# Patient Record
Sex: FEMALE | Race: WHITE | NOT HISPANIC OR LATINO | Employment: FULL TIME | URBAN - METROPOLITAN AREA
[De-identification: names, ages, dates, MRNs, and addresses within clinical notes are randomized per-mention and may not be internally consistent; named-entity substitution may affect disease eponyms.]

---
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
## 2019-03-01 ENCOUNTER — HOSPITAL ENCOUNTER (EMERGENCY)
Facility: HOSPITAL | Age: 19
Discharge: HOME | End: 2019-03-01
Attending: EMERGENCY MEDICINE

## 2019-03-01 VITALS
SYSTOLIC BLOOD PRESSURE: 151 MMHG | WEIGHT: 135 LBS | BODY MASS INDEX: 26.5 KG/M2 | DIASTOLIC BLOOD PRESSURE: 79 MMHG | TEMPERATURE: 97.9 F | HEIGHT: 60 IN | HEART RATE: 100 BPM | RESPIRATION RATE: 16 BRPM | OXYGEN SATURATION: 100 %

## 2019-03-01 DIAGNOSIS — M54.50 ACUTE LOW BACK PAIN: Primary | ICD-10-CM

## 2019-03-01 LAB
BILIRUB UR QL STRIP: NEGATIVE
CLARITY UR: ABNORMAL
COLOR UR: YELLOW
GLUCOSE UR STRIP-MCNC: NEGATIVE MG/DL
HGB UR QL STRIP.AUTO: NEGATIVE
KETONES UR STRIP-MCNC: ABNORMAL MG/DL
LEUKOCYTE ESTERASE UR QL STRIP: NEGATIVE
NITRITE UR QL STRIP: NEGATIVE
PH UR STRIP.AUTO: 5.5 [PH] (ref 5–8)
PROT UR STRIP-MCNC: NEGATIVE MG/DL
SP GR UR STRIP.AUTO: >=1.03 (ref 1–1.03)
UROBILINOGEN UR QL STRIP.AUTO: 0.2 E.U./DL

## 2019-03-01 PROCEDURE — 99283 EMERGENCY DEPT VISIT LOW MDM: CPT

## 2019-03-01 PROCEDURE — 81003 URINALYSIS AUTO W/O SCOPE: CPT | Performed by: EMERGENCY MEDICINE

## 2019-03-01 RX ORDER — IBUPROFEN 600 MG/1
600 TABLET ORAL EVERY 6 HOURS PRN
Qty: 30 TABLET | Refills: 0 | Status: SHIPPED | OUTPATIENT
Start: 2019-03-01 | End: 2019-03-17

## 2019-03-01 RX ORDER — CYCLOBENZAPRINE HCL 10 MG
10 TABLET ORAL 2 TIMES DAILY PRN
Qty: 20 TABLET | Refills: 0 | Status: SHIPPED | OUTPATIENT
Start: 2019-03-01 | End: 2019-03-17

## 2019-03-02 NOTE — ED PROVIDER NOTES
History  Chief Complaint   Patient presents with    Back Pain     low back pain since wednesday  no urinary c/o  denies other sx  no injury     Patient is a 50-year-old female without significant past medical history who works at [employer] she says for the past several days she has had low back pain  She says the pain does not radiate into her buttocks into her thighs  She has no numbness tingling  She says the pain is only in her lower back  She has no direct trauma  She denies any nausea vomiting patient that no diarrhea constipation  She has no frequency urgency or dysuria  None       Past Medical History:   Diagnosis Date    Viral gastroenteritis     [id number] RESOLVED       Past Surgical History:   Procedure Laterality Date    EYE SURGERY      STRABISMUS SURGERY      TONSILLECTOMY         Family History   Problem Relation Age of Onset    No Known Problems Mother     Hypertension Maternal Grandmother     Hyperlipidemia Maternal Grandfather     Hypertension Maternal Grandfather     No Known Problems Family      I have reviewed and agree with the history as documented  Social History     Tobacco Use    Smoking status: Never Smoker    Smokeless tobacco: Former User   Substance Use Topics    Alcohol use: No    Drug use: No        Review of Systems   Constitutional: Negative for chills, fatigue, fever and unexpected weight change  HENT: Negative for congestion and nosebleeds  Eyes: Negative for visual disturbance  Respiratory: Negative for chest tightness and shortness of breath  Cardiovascular: Negative for chest pain, palpitations and leg swelling  Gastrointestinal: Negative for abdominal pain, blood in stool, diarrhea, nausea and vomiting  Endocrine: Negative for cold intolerance and heat intolerance  Genitourinary: Negative for difficulty urinating  Musculoskeletal: Negative for arthralgias, back pain, gait problem, joint swelling and myalgias     Skin: Negative for rash  Neurological: Negative for dizziness, speech difficulty, weakness and headaches  Psychiatric/Behavioral: Negative for behavioral problems, confusion, self-injury and suicidal ideas  All other systems reviewed and are negative  Physical Exam  Physical Exam   Constitutional: She is oriented to person, place, and time  She appears well-developed and well-nourished  HENT:   Head: Normocephalic and atraumatic  Nose: Nose normal    Eyes: Pupils are equal, round, and reactive to light  EOM are normal    Neck: Normal range of motion  Neck supple  Cardiovascular: Normal rate, regular rhythm and normal heart sounds  Exam reveals no gallop and no friction rub  No murmur heard  Pulmonary/Chest: Effort normal and breath sounds normal  No respiratory distress  She has no wheezes  She has no rales  Abdominal: Soft  She exhibits no distension  There is no tenderness  There is no rebound and no guarding  Musculoskeletal: Normal range of motion  She exhibits no edema  Deep tendon reflexes are intact  She has no for costovertebral angle tenderness  She has negative leg lift bilaterally she does have some mild discomfort over her paraspinal musculature   Neurological: She is alert and oriented to person, place, and time  Skin: Skin is warm and dry  Psychiatric: She has a normal mood and affect  Her behavior is normal  Judgment and thought content normal    Nursing note and vitals reviewed        Vital Signs  ED Triage Vitals [03/01/19 2312]   Temperature Pulse Respirations Blood Pressure SpO2   97 9 °F (36 6 °C) 100 16 151/79 100 %      Temp src Heart Rate Source Patient Position - Orthostatic VS BP Location FiO2 (%)   -- -- -- -- --      Pain Score       5           Vitals:    03/01/19 2312   BP: 151/79   Pulse: 100       Visual Acuity      ED Medications  Medications - No data to display    Diagnostic Studies  Results Reviewed     None                 No orders to display Procedures  Procedures       Phone Contacts  ED Phone Contact    ED Course                               MDM    Disposition  Final diagnoses:   None     ED Disposition     None      Follow-up Information    None         Patient's Medications    No medications on file     No discharge procedures on file      ED Provider  Electronically Signed by           Jensen Castaneda MD  03/02/19 7543

## 2019-03-17 ENCOUNTER — HOSPITAL ENCOUNTER (EMERGENCY)
Facility: HOSPITAL | Age: 19
Discharge: HOME | End: 2019-03-17
Attending: EMERGENCY MEDICINE | Admitting: EMERGENCY MEDICINE

## 2019-03-17 ENCOUNTER — APPOINTMENT (EMERGENCY)
Dept: RADIOLOGY | Facility: HOSPITAL | Age: 19
End: 2019-03-17

## 2019-03-17 VITALS
DIASTOLIC BLOOD PRESSURE: 97 MMHG | BODY MASS INDEX: 26.5 KG/M2 | HEART RATE: 68 BPM | RESPIRATION RATE: 16 BRPM | OXYGEN SATURATION: 98 % | HEIGHT: 60 IN | SYSTOLIC BLOOD PRESSURE: 144 MMHG | TEMPERATURE: 97.6 F | WEIGHT: 135 LBS

## 2019-03-17 DIAGNOSIS — M67.442 GANGLION CYST OF FINGER OF LEFT HAND: ICD-10-CM

## 2019-03-17 DIAGNOSIS — M79.642 LEFT HAND PAIN: Primary | ICD-10-CM

## 2019-03-17 PROCEDURE — 73130 X-RAY EXAM OF HAND: CPT

## 2019-03-17 PROCEDURE — 99283 EMERGENCY DEPT VISIT LOW MDM: CPT

## 2019-03-18 NOTE — ED PROVIDER NOTES
History  Chief Complaint   Patient presents with    Hand Pain     pain base of l eft 3rd finger "with a lump" for 1 week  no injury     A 20YEAR-OLD FEMALE DOMINANT RIGHT HAND PATIENT WITH NO HISTORY OF DISEASE PRESENTS TO THE EMERGENCY DEPARTMENT WITH LEFT MIDDLE FINGER PAIN FOR THE PAST WEEK  SHE WORKS IN A WAREHOUSE UTILIZING BOTH HANDS  SHE DENIES TRAUMA HOWEVER  SHE STATES THAT THE PAIN IS WORSE WITH MOVEMENT  TODAY SHE UNCOVERED A LUMP IN THE HAND  SHE REPORTS NO NUMBNESS OR TINGLING NO INTERFERENCE WITH FUNCTION  None       Past Medical History:   Diagnosis Date    Viral gastroenteritis     [id number] RESOLVED       Past Surgical History:   Procedure Laterality Date    EYE SURGERY      STRABISMUS SURGERY      TONSILLECTOMY         Family History   Problem Relation Age of Onset    No Known Problems Mother     Hypertension Maternal Grandmother     Hyperlipidemia Maternal Grandfather     Hypertension Maternal Grandfather     No Known Problems Family      I have reviewed and agree with the history as documented  Social History     Tobacco Use    Smoking status: Current Every Day Smoker     Packs/day: 0 50    Smokeless tobacco: Former User   Substance Use Topics    Alcohol use: No    Drug use: No        Review of Systems   Constitutional: Negative for chills and fever  HENT: Negative for ear pain, rhinorrhea and sore throat  Eyes: Negative for pain, redness and visual disturbance  Respiratory: Negative for cough and shortness of breath  Cardiovascular: Negative for chest pain and leg swelling  Gastrointestinal: Negative for abdominal pain, diarrhea, nausea and vomiting  Genitourinary: Negative for dysuria, flank pain, frequency and urgency  Musculoskeletal: Negative for back pain, myalgias and neck pain  Skin: Negative for rash  Neurological: Negative for dizziness, weakness, light-headedness and headaches  Hematological: Negative      Psychiatric/Behavioral: Negative for agitation, confusion and suicidal ideas  The patient is not nervous/anxious  All other systems reviewed and are negative  Physical Exam  Physical Exam   Constitutional: She is oriented to person, place, and time  She appears well-developed and well-nourished  HENT:   Nose: Nose normal    Mouth/Throat: Oropharynx is clear and moist  No oropharyngeal exudate  Eyes: Pupils are equal, round, and reactive to light  Conjunctivae and EOM are normal  No scleral icterus  Neck: Normal range of motion  Neck supple  No JVD present  No tracheal deviation present  Cardiovascular: Normal rate, regular rhythm and normal heart sounds  No murmur heard  Pulmonary/Chest: Effort normal and breath sounds normal  No respiratory distress  She has no wheezes  She has no rales  Abdominal: Soft  Bowel sounds are normal  There is no tenderness  There is no guarding  Musculoskeletal: Normal range of motion  She exhibits tenderness  She exhibits no edema  THERE ARE NO DEFORMITIES TO THE RIGHT HAND  THE LEFT HAND IS UNREMARKABLE WITH SOME MINOR TENDERNESS OF THE PALMAR ASPECT JUST PROXIMAL TO THE MCP JOINT OF THE 3RD DIGIT WHERE THERE IS ASSOCIATED APPROXIMATE 3-4 MM SHE IS SOMEWHAT FIRM MOVABLE SUBDERMAL MASS  NEUROVASCULAR AND SENSORY IS INTACT WITH FULL RANGE OF MOTION THE DIGITS AND HAND  THERE ARE NO ASSOCIATED DEFORMITIES OF THE DIGITS THE LEFT HAND  Neurological: She is alert and oriented to person, place, and time  No cranial nerve deficit or sensory deficit  She exhibits normal muscle tone  5/5 motor, nl sens   Skin: Skin is warm and dry  Psychiatric: She has a normal mood and affect  Her behavior is normal    Nursing note and vitals reviewed        Vital Signs  ED Triage Vitals [03/17/19 2053]   Temperature Pulse Respirations Blood Pressure SpO2   97 6 °F (36 4 °C) 68 16 144/97 98 %      Temp src Heart Rate Source Patient Position - Orthostatic VS BP Location FiO2 (%)   -- -- -- -- -- Pain Score       6           Vitals:    03/17/19 2053   BP: 144/97   Pulse: 68       qSOFA     Row Name 03/17/19 2053                Altered mental status GCS < 15  --        Respiratory Rate > / =89  0        Systolic BP < / =546  0        Q Sofa Score  0              Visual Acuity      ED Medications  Medications - No data to display    Diagnostic Studies  Results Reviewed     None                 XR hand 3+ views LEFT    (Results Pending)              Procedures  Procedures       Phone Contacts  ED Phone Contact    ED Course                               MDM    Disposition  Final diagnoses:   None     ED Disposition     None      Follow-up Information    None         Patient's Medications   Discharge Prescriptions    No medications on file     No discharge procedures on file      ED Provider  Electronically Signed by           Jovany Root MD  03/17/19 2476

## 2019-06-23 ENCOUNTER — HOSPITAL ENCOUNTER (EMERGENCY)
Facility: HOSPITAL | Age: 19
Discharge: HOME | End: 2019-06-23
Attending: EMERGENCY MEDICINE

## 2019-06-23 VITALS
OXYGEN SATURATION: 100 % | HEIGHT: 60 IN | DIASTOLIC BLOOD PRESSURE: 81 MMHG | SYSTOLIC BLOOD PRESSURE: 126 MMHG | TEMPERATURE: 98.2 F | HEART RATE: 81 BPM | BODY MASS INDEX: 28.47 KG/M2 | WEIGHT: 145 LBS | RESPIRATION RATE: 20 BRPM

## 2019-06-23 DIAGNOSIS — L73.9 FOLLICULITIS: Primary | ICD-10-CM

## 2019-06-23 PROCEDURE — 99282 EMERGENCY DEPT VISIT SF MDM: CPT

## 2019-06-23 RX ORDER — SULFAMETHOXAZOLE AND TRIMETHOPRIM 800; 160 MG/1; MG/1
1 TABLET ORAL 2 TIMES DAILY
Qty: 28 TABLET | Refills: 0 | Status: SHIPPED | OUTPATIENT
Start: 2019-06-23 | End: 2019-07-07

## 2019-06-23 RX ORDER — SULFAMETHOXAZOLE AND TRIMETHOPRIM 800; 160 MG/1; MG/1
1 TABLET ORAL ONCE
Status: COMPLETED | OUTPATIENT
Start: 2019-06-23 | End: 2019-06-23

## 2019-06-23 RX ADMIN — SULFAMETHOXAZOLE AND TRIMETHOPRIM 1 TABLET: 800; 160 TABLET ORAL at 19:28

## 2019-06-23 NOTE — ED PROVIDER NOTES
History  Chief Complaint   Patient presents with    Rash     Patient has raised areas on inside of thighs and on buttucks thast are painful and appear to be spreading  No fevers per pt     5 months of skin spots on inside thighs and buttocks  She pops small pimples  No fever  History provided by:  Patient  Rash   Associated symptoms: no abdominal pain, no fever, no shortness of breath and no sore throat        None       Past Medical History:   Diagnosis Date    Viral gastroenteritis     [id number] RESOLVED       Past Surgical History:   Procedure Laterality Date    EYE SURGERY      STRABISMUS SURGERY      TONSILLECTOMY         Family History   Problem Relation Age of Onset    No Known Problems Mother     Hypertension Maternal Grandmother     Hyperlipidemia Maternal Grandfather     Hypertension Maternal Grandfather     No Known Problems Family      I have reviewed and agree with the history as documented  Social History     Tobacco Use    Smoking status: Current Every Day Smoker     Packs/day: 0 50    Smokeless tobacco: Former User   Substance Use Topics    Alcohol use: No    Drug use: No        Review of Systems   Constitutional: Negative for chills and fever  HENT: Negative for rhinorrhea, sinus pain and sore throat  Respiratory: Negative for cough and shortness of breath  Gastrointestinal: Negative for abdominal pain  Genitourinary: Negative for dysuria  Musculoskeletal: Negative for neck pain  Skin: Positive for rash  Physical Exam  Physical Exam   Constitutional: She appears well-developed and well-nourished  HENT:   Head: Normocephalic  Eyes: Conjunctivae are normal    Neck: Neck supple  Pulmonary/Chest: Effort normal    Musculoskeletal: Normal range of motion  She exhibits no edema  Neurological: She is alert  Skin: Rash noted  Nursing note and vitals reviewed        Vital Signs  ED Triage Vitals [06/23/19 1917]   Temperature Pulse Respirations Blood Pressure SpO2   98 2 °F (36 8 °C) 81 20 126/81 100 %      Temp Source Heart Rate Source Patient Position - Orthostatic VS BP Location FiO2 (%)   Tympanic Monitor Sitting Left arm --      Pain Score       2           Vitals:    06/23/19 1917   BP: 126/81   Pulse: 81   Patient Position - Orthostatic VS: Sitting         Visual Acuity      ED Medications  Medications   sulfamethoxazole-trimethoprim (BACTRIM DS) 800-160 mg per tablet 1 tablet (1 tablet Oral Given 6/23/19 1928)       Diagnostic Studies  Results Reviewed     None                 No orders to display              Procedures  Procedures       ED Course                               MDM    Disposition  Final diagnoses: Folliculitis     Time reflects when diagnosis was documented in both MDM as applicable and the Disposition within this note     Time User Action Codes Description Comment    6/23/2019  7:25 PM [address] [address] [D56 0] Folliculitis       ED Disposition     ED Disposition Condition Date/Time Comment    Discharge Good Sun Jun 23, 2019  7:25 PM Jewels Sun discharge to home/self care  Follow-up Information    None         Patient's Medications   Discharge Prescriptions    MUPIROCIN (BACTROBAN) 2 % NASAL OINTMENT    Apply twice a day to sores on skin until all healed fully       Start Date: 6/23/2019 End Date: --       Order Dose: --       Quantity: 5 g    Refills: 0    SULFAMETHOXAZOLE-TRIMETHOPRIM (BACTRIM DS) 800-160 MG PER TABLET    Take 1 tablet by mouth 2 (two) times a day for 14 days smx-tmp DS (BACTRIM) 800-160 mg tabs (1tab q12 D10)       Start Date: 6/23/2019 End Date: 7/7/2019       Order Dose: 1 tablet       Quantity: 28 tablet    Refills: 0     No discharge procedures on file      ED Provider  Electronically Signed by           Tere Boyd MD  06/23/19 6962

## 2019-06-23 NOTE — DISCHARGE INSTRUCTIONS
Use antibiotic pill twice a day for 2 week  Use ointment on sores twice a day until all healed  See your regular Family Doctor if not better after 2 weeks

## 2019-08-22 ENCOUNTER — HOSPITAL ENCOUNTER (EMERGENCY)
Facility: HOSPITAL | Age: 19
Discharge: HOME | End: 2019-08-22
Admitting: EMERGENCY MEDICINE

## 2019-08-22 VITALS
OXYGEN SATURATION: 97 % | HEART RATE: 87 BPM | RESPIRATION RATE: 18 BRPM | DIASTOLIC BLOOD PRESSURE: 75 MMHG | TEMPERATURE: 98.8 F | WEIGHT: 135 LBS | BODY MASS INDEX: 26.5 KG/M2 | HEIGHT: 60 IN | SYSTOLIC BLOOD PRESSURE: 137 MMHG

## 2019-08-22 DIAGNOSIS — M65.332 TRIGGER MIDDLE FINGER OF LEFT HAND: Primary | ICD-10-CM

## 2019-08-22 PROCEDURE — 99283 EMERGENCY DEPT VISIT LOW MDM: CPT

## 2019-08-22 RX ORDER — NAPROXEN 500 MG/1
500 TABLET ORAL ONCE
Status: COMPLETED | OUTPATIENT
Start: 2019-08-22 | End: 2019-08-22

## 2019-08-22 RX ADMIN — NAPROXEN 500 MG: 500 TABLET ORAL at 04:32

## 2019-08-22 NOTE — DISCHARGE INSTRUCTIONS
WEAR THE SPLINT INTERMITTENTLY, AS NEEDED  TAKE 2 ALEVE TWICE DAILY WITH FOOD FOR 3 DAYS THEN AS NEEDED WITH FLARE-UP SEVERE SYMPTOMS    CONTACT YOUR FAMILY DOCTOR OR THE ORTHOPEDIC DOCTOR LISTED FOR POSSIBLE EVALUATION FOR INJECTIONS IF YOU HAVE FREQUENT RECURRENCE TRIGGER FINGER SYMPTOMS

## 2019-08-22 NOTE — ED PROVIDER NOTES
History  Chief Complaint   Patient presents with    Finger Pain     reports injured her left middle finger in the past playing baseketball - works at [employer] and yesterday pushed a box and complaining of pain in left middle finger      PATIENT PRESENTS TO THE ER FOR EVALUATION OF LEFT HAND PAIN  PATIENT HAS RECURRENT PAIN AT THE BASE OF HER LEFT MIDDLE FINGER, ON THE PALMAR SURFACE  SHE WAS PREVIOUSLY EVALUATED AT [facility] SEVERAL MONTHS AGO AND NOTES SHE WAS GIVEN A SPLINT  SHE LEFT WORK TODAY BECAUSE OF RECURRENT PAIN AT THE BASE OF HER MIDDLE FINGER  SHE NOTES THAT SHE HAS HAD THE FINGER STUCK IN A FLEXION POSITION A FEW TIMES  THIS OCCURS LESS THAN 1 TIME PER WEEK  Prior to Admission Medications   Prescriptions Last Dose Informant Patient Reported? Taking?   mupirocin (BACTROBAN) 2 % nasal ointment   No No   Sig: Apply twice a day to sores on skin until all healed fully      Facility-Administered Medications: None       Past Medical History:   Diagnosis Date    Viral gastroenteritis     [id number] RESOLVED       Past Surgical History:   Procedure Laterality Date    EYE SURGERY      STRABISMUS SURGERY      TONSILLECTOMY         Family History   Problem Relation Age of Onset    No Known Problems Mother     Hypertension Maternal Grandmother     Hyperlipidemia Maternal Grandfather     Hypertension Maternal Grandfather     No Known Problems Family      I have reviewed and agree with the history as documented  Social History     Tobacco Use    Smoking status: Current Every Day Smoker     Packs/day: 0 50    Smokeless tobacco: Former User   Substance Use Topics    Alcohol use: No    Drug use: No        Review of Systems   Constitutional: Negative for chills and fever  Musculoskeletal: Positive for arthralgias  Negative for neck pain  Skin: Negative  Neurological: Negative  Physical Exam  Physical Exam   Constitutional: She is oriented to person, place, and time   She appears well-developed and well-nourished  PATIENT IS AMBULATORY  NO ACUTE DISTRESS  NONTOXIC  HENT:   Head: Normocephalic and atraumatic  Musculoskeletal: She exhibits no deformity  PATIENT HAS NO DEFORMITY OR VISIBLE OR PALPABLE SWELLING OF HER LEFT HAND PARTICULARLY THE INDEX FINGER  THERE IS NORMAL TENDON FUNCTION AT THIS TIME BOTH ACTIVE AND PASSIVE  THERE IS NO CLICKING OR CATCHING OF THE INDIVIDUAL JOINTS OF THE RIGHT MIDDLE FINGER  THE POSSIBLE NODULARITY AT THE PALMAR ASPECT OF THE MCP JOINT IS PRESENT  Neurological: She is alert and oriented to person, place, and time  No cranial nerve deficit  She exhibits normal muscle tone  Skin: Skin is warm  Capillary refill takes less than 2 seconds  No rash noted  Vitals reviewed  Vital Signs  ED Triage Vitals [08/22/19 0414]   Temperature Pulse Respirations Blood Pressure SpO2   98 8 °F (37 1 °C) 87 18 137/75 97 %      Temp Source Heart Rate Source Patient Position - Orthostatic VS BP Location FiO2 (%)   Tympanic Monitor Sitting Left arm --      Pain Score       8           Vitals:    08/22/19 0414   BP: 137/75   Pulse: 87   Patient Position - Orthostatic VS: Sitting         Visual Acuity      ED Medications  Medications   naproxen (NAPROSYN) tablet 500 mg (500 mg Oral Given 8/22/19 0432)       Diagnostic Studies  Results Reviewed     None                 No orders to display              Procedures  Procedures       ED Course        EXAMINATION EVALUATION  BASED ON ROOM THE PATIENT'S DESCRIPTION, THE PATIENT LIKELY HAS A TENOSYNOVITIS WITH A TRIGGER FINGER OCCURRENCE BUT NOT PRESENTLY  REVIEWED BASIC TREATMENT OF ANTI-INFLAMMATORY AND INTERMITTENT SPLINTING    RECOMMENDED CONTACTING ORTHOPEDICS FOR DISCUSSION OF POSSIBLE STEROID INJECTIONS IF THE TRIGGER FOR HER BECOMES RECURRENCE                         MDM    Disposition  Final diagnoses:   Trigger middle finger of left hand     Time reflects when diagnosis was documented in both MDM as applicable and the Disposition within this note     Time User Action Codes Description Comment    8/22/2019  4:30 AM Marcarolina Self Add [Q11 308] Trigger middle finger of left hand       ED Disposition     ED Disposition Condition Date/Time Comment    Discharge Stable Thu Aug 22, 2019  4:31 AM Jewels Sun discharge to home/self care  Follow-up Information     Follow up With Specialties Details Why Contact Info    Tal Cavanaugh DO Orthopedic Surgery   [address]  [facility]  [address] [address]  [address] [facility] [address]            Discharge Medication List as of 8/22/2019  4:32 AM      CONTINUE these medications which have NOT CHANGED    Details   mupirocin (BACTROBAN) 2 % nasal ointment Apply twice a day to sores on skin until all healed fully, Print           No discharge procedures on file      ED Provider  Electronically Signed by           Rossi Saavedra DO  08/22/19 5414

## 2019-08-30 ENCOUNTER — OFFICE VISIT (OUTPATIENT)
Dept: OBGYN CLINIC | Facility: CLINIC | Age: 19
End: 2019-08-30

## 2019-08-30 VITALS
HEIGHT: 60 IN | BODY MASS INDEX: 26.5 KG/M2 | WEIGHT: 135 LBS | HEART RATE: 82 BPM | SYSTOLIC BLOOD PRESSURE: 111 MMHG | DIASTOLIC BLOOD PRESSURE: 74 MMHG

## 2019-08-30 DIAGNOSIS — M79.645 PAIN OF FINGER OF LEFT HAND: Primary | ICD-10-CM

## 2019-08-30 PROCEDURE — 99203 OFFICE O/P NEW LOW 30 MIN: CPT | Performed by: ORTHOPAEDIC SURGERY

## 2019-08-30 NOTE — LETTER
August 30, 2019     Patient: Jewels Sun   YOB: 2000   Date of Visit: 8/30/2019       To Whom it May Concern:    Rosa Torre is under my professional care  She was seen in my office on 8/30/2019  She is cleared to return to work as tolerated without limitations or restrictions beginning 9/3/2019  If you have any questions or concerns, please don't hesitate to call           Sincerely,          Michelle Trinh MD        CC: No Recipients

## 2019-08-30 NOTE — PROGRESS NOTES
CHIEF COMPLAINT:  Chief Complaint   Patient presents with    Left Middle Finger - Pain       SUBJECTIVE:  Jewels Sun is a 20y o  year old RHD female who presents with chief complaint of left long finger pain  She reports that she suffered a hyperextension injury in March of this year while playing basketball  Her pain following is injury had somewhat improved but she mentions recurrence on 8/22/2019 while at work  She was initially evaluated at [facility] ED where xrays were taken and read as negative  She describes her pain as being palmar, and located at the base of the left long finger  She reports occasional "clicking and sticking" with finger flexion that gets worse with work related activities  She denies any apparent bruising, swelling, numbness, tingling, or feelings of instability  She is not currently taking any medications for pain  PAST MEDICAL HISTORY:  Past Medical History:   Diagnosis Date    Viral gastroenteritis     [id number] RESOLVED       PAST SURGICAL HISTORY:  Past Surgical History:   Procedure Laterality Date    EYE SURGERY      STRABISMUS SURGERY      TONSILLECTOMY         FAMILY HISTORY:  Family History   Problem Relation Age of Onset    No Known Problems Mother     Hypertension Maternal Grandmother     Hyperlipidemia Maternal Grandfather     Hypertension Maternal Grandfather     No Known Problems Family        SOCIAL HISTORY:  Social History     Tobacco Use    Smoking status: Current Every Day Smoker     Packs/day: 0 50    Smokeless tobacco: Former User   Substance Use Topics    Alcohol use: No    Drug use: No       MEDICATIONS:  No current outpatient medications on file  ALLERGIES:  Allergies   Allergen Reactions    Penicillins        REVIEW OF SYSTEMS:  Review of Systems   Constitutional: Negative for chills, fever and unexpected weight change  HENT: Negative for hearing loss, nosebleeds and sore throat      Eyes: Negative for pain, redness and visual disturbance  Respiratory: Negative for cough, shortness of breath and wheezing  Cardiovascular: Negative for chest pain, palpitations and leg swelling  Gastrointestinal: Negative for abdominal pain, nausea and vomiting  Endocrine: Negative for polydipsia and polyuria  Genitourinary: Negative for dysuria and hematuria  Musculoskeletal:        As noted in HPI   Skin: Negative for rash and wound  Neurological: Negative for dizziness, numbness and headaches  Psychiatric/Behavioral: Negative for decreased concentration and suicidal ideas  The patient is not nervous/anxious  VITALS:  Vitals:    08/30/19 1127   BP: 111/74   Pulse: 82       LABS:  HgA1c: No results found for: HGBA1C  BMP:   Lab Results   Component Value Date    GLUCOSE 88 03/10/2015    CALCIUM 9 5 04/27/2018     04/27/2018    K 3 9 04/27/2018    CO2 25 04/27/2018     04/27/2018    BUN 10 04/27/2018    CREATININE 0 73 04/27/2018       _____________________________________________________  PHYSICAL EXAMINATION:  General: well developed and well nourished, alert, oriented times 3 and appears comfortable  Psychiatric: Normal  HEENT: Trachea Midline, No torticollis  Pulmonary: No audible wheezing or respiratory distress   Skin: No masses, erythema, lacerations, fluctation, ulcerations  Neurovascular: Sensation Intact to the Median, Ulnar, Radial Nerve, Motor Intact to the Median, Ulnar, Radial Nerve and Pulses Intact    MUSCULOSKELETAL EXAMINATION:  Left hand - patient presents with no obvious anatomical deformities  Skin is warm and dry to touch with no signs of erythema, ecchymosis, or infection  She is tender to palpation over the long finger flexor tendon at the A1 pulley with no palpable deformity or reproducible clicking on exam   She demonstrates full composite finger flexion and extension of the long finger as compared to contralateral extremity and other fingers    MP joint is stable to varus, valgus, anterior, and posterior stress  2+ distal radial pulse with brisk capillary refill distally  Sensation intact     ___________________________________________________  STUDIES REVIEWED:  Attending Physician has personally reviewed pertinent imaging and/or reports in PACS, impression is as follows:    Radiographic series taken 8/22/2019 of the left hand shows no signs of osseous abnormalities or malalignment  PROCEDURES PERFORMED:  Procedures  No Procedures performed today    _____________________________________________________  ASSESSMENT/PLAN:  Left long finger flexor tendon strain  · Discussed with patient that there is no reproducible triggering on exam and no palpable deformity of the flexor tendon that would denote and active trigger finger pathology  · Moist heat and MP joint extension stretching for increased flexibility of the tendon  · Encouraged to use hand normally  · Discussed that if symptoms should worsen, or triggering becomes more consistent she can contact the office for follow-up appointment and we can consider injection as treatment    Diagnoses and all orders for this visit:    Pain of finger of left hand    Other orders  -     Cancel: XR finger left third digit-middle; Future        Follow Up:  Return if symptoms worsen or fail to improve, for Re-evaluation  Work/school status:  Employed for [employer], return to work without limitations or restrictions beginning 9/3/2019    To Do Next Visit:  Re-evaluation of current issue    General Discussions:  Trigger Finger: The anatomy and physiology of trigger finger was discussed with the patient today in the office  Edema and increased contact pressure within the flexor tendons at the A1 pulley can cause pain, crepitation, and triggering or locking of the digit resulting in limitation of function  Symptoms can occur at anytime but are typically worse in the morning or after a brief rest from repetitive activity    Treatment options include resting/nighttime MP blocking splints to decrease edema, oral anti-inflammatory medications, home or formal therapy exercises, up to 2 steroid injections within the tendon sheath, or surgical release  While majority of patients do respond to conservative treatment, up to 20% may require surgical release         Scribe Attestation    I,:   Odell Maldonado am acting as a scribe while in the presence of the attending physician :        I,:   Eddie Donnelly MD personally performed the services described in this documentation    as scribed in my presence :

## 2020-01-03 ENCOUNTER — OFFICE VISIT (OUTPATIENT)
Dept: FAMILY MEDICINE CLINIC | Facility: CLINIC | Age: 20
End: 2020-01-03
Payer: COMMERCIAL

## 2020-01-03 VITALS
SYSTOLIC BLOOD PRESSURE: 120 MMHG | RESPIRATION RATE: 16 BRPM | WEIGHT: 154 LBS | HEART RATE: 104 BPM | TEMPERATURE: 97.7 F | OXYGEN SATURATION: 98 % | HEIGHT: 59 IN | DIASTOLIC BLOOD PRESSURE: 84 MMHG | BODY MASS INDEX: 31.04 KG/M2

## 2020-01-03 DIAGNOSIS — A08.4 VIRAL GASTROENTERITIS: Primary | ICD-10-CM

## 2020-01-03 PROCEDURE — 99213 OFFICE O/P EST LOW 20 MIN: CPT | Performed by: PHYSICIAN ASSISTANT

## 2020-01-03 PROCEDURE — 3008F BODY MASS INDEX DOCD: CPT | Performed by: PHYSICIAN ASSISTANT

## 2020-01-03 RX ORDER — ONDANSETRON 4 MG/1
4 TABLET, FILM COATED ORAL EVERY 8 HOURS PRN
Qty: 20 TABLET | Refills: 0 | Status: SHIPPED | OUTPATIENT
Start: 2020-01-03

## 2020-01-03 NOTE — LETTER
January 3, 2020     Patient: Jewels Sun   YOB: 2000   Date of Visit: 1/3/2020       To Whom it May Concern:    Sheldon Blevins is under my professional care  She was seen in my office on 1/3/2020  She may be excused from work Jan 1-3rd  She may return to work on Sunday Jan 5th  If you have any questions or concerns, please don't hesitate to call           Sincerely,          Masha Rivera PA-C

## 2020-01-16 ENCOUNTER — OFFICE VISIT (OUTPATIENT)
Dept: URGENT CARE | Facility: HOSPITAL | Age: 20
End: 2020-01-16

## 2020-01-16 VITALS
SYSTOLIC BLOOD PRESSURE: 115 MMHG | HEIGHT: 60 IN | HEART RATE: 97 BPM | TEMPERATURE: 97.5 F | DIASTOLIC BLOOD PRESSURE: 74 MMHG | OXYGEN SATURATION: 97 % | RESPIRATION RATE: 18 BRPM | WEIGHT: 152.2 LBS | BODY MASS INDEX: 29.88 KG/M2

## 2020-01-16 DIAGNOSIS — J02.9 SORE THROAT: Primary | ICD-10-CM

## 2020-01-16 LAB — S PYO AG THROAT QL: NEGATIVE

## 2020-01-16 PROCEDURE — 99203 OFFICE O/P NEW LOW 30 MIN: CPT | Performed by: EMERGENCY MEDICINE

## 2020-01-16 PROCEDURE — 87880 STREP A ASSAY W/OPTIC: CPT | Performed by: EMERGENCY MEDICINE

## 2020-01-16 PROCEDURE — 87070 CULTURE OTHR SPECIMN AEROBIC: CPT | Performed by: EMERGENCY MEDICINE

## 2020-01-16 RX ORDER — AZITHROMYCIN 250 MG/1
TABLET, FILM COATED ORAL
Qty: 6 TABLET | Refills: 0 | Status: SHIPPED | OUTPATIENT
Start: 2020-01-16 | End: 2020-01-20

## 2020-01-16 NOTE — PATIENT INSTRUCTIONS
1   Your rapid strep test is negativ today  2  A throat culture is pending in 48 hours  Please call for results  3  Chloroseptic spray for pain  4   May use Tylenol or Advil for pain  Pharyngitis   WHAT YOU NEED TO KNOW:   Pharyngitis, or sore throat, is inflammation of the tissues and structures in your pharynx (throat)  Pharyngitis is most often caused by bacteria  It may also be caused by a cold or flu virus  Other causes include smoking, allergies, or acid reflux  DISCHARGE INSTRUCTIONS:   Call 911 for any of the following:   · You have trouble breathing or swallowing because your throat is swollen or sore  Return to the emergency department if:   · You are drooling because it hurts too much to swallow  · Your fever is higher than 102? F (39?C) or lasts longer than 3 days  · You are confused  · You taste blood in your throat  Contact your healthcare provider if:   · Your throat pain gets worse  · You have a painful lump in your throat that does not go away after 5 days  · Your symptoms do not improve after 5 days  · You have questions or concerns about your condition or care  Medicines:  Viral pharyngitis will go away on its own without treatment  Your sore throat should start to feel better in 3 to 5 days for both viral and bacterial infections  You may need any of the following:  · Antibiotics  treat a bacterial infection  · NSAIDs , such as ibuprofen, help decrease swelling, pain, and fever  NSAIDs can cause stomach bleeding or kidney problems in certain people  If you take blood thinner medicine, always ask your healthcare provider if NSAIDs are safe for you  Always read the medicine label and follow directions  · Acetaminophen  decreases pain and fever  It is available without a doctor's order  Ask how much to take and how often to take it  Follow directions  Acetaminophen can cause liver damage if not taken correctly  · Take your medicine as directed    Contact your healthcare provider if you think your medicine is not helping or if you have side effects  Tell him or her if you are allergic to any medicine  Keep a list of the medicines, vitamins, and herbs you take  Include the amounts, and when and why you take them  Bring the list or the pill bottles to follow-up visits  Carry your medicine list with you in case of an emergency  Manage your symptoms:   · Gargle salt water  Mix ¼ teaspoon salt in an 8 ounce glass of warm water and gargle  This may help decrease swelling in your throat  · Drink liquids as directed  You may need to drink more liquids than usual  Liquids may help soothe your throat and prevent dehydration  Ask how much liquid to drink each day and which liquids are best for you  · Use a cool-steam humidifier  to help moisten the air in your room and calm your cough  · Soothe your throat  with cough drops, ice, soft foods, or popsicles  Prevent the spread of pharyngitis:  Cover your mouth and nose when you cough or sneeze  Do not share food or drinks  Wash your hands often  Use soap and water  If soap and water are unavailable, use an alcohol based hand   Follow up with your healthcare provider as directed:  Write down your questions so you remember to ask them during your visits  © 2017 [facility] Information is for End User's use only and may not be sold, redistributed or otherwise used for commercial purposes  All illustrations and images included in CareNotes® are the copyrighted property of [employer]  or [employer]  The above information is an  only  It is not intended as medical advice for individual conditions or treatments  Talk to your doctor, nurse or pharmacist before following any medical regimen to see if it is safe and effective for you

## 2020-01-16 NOTE — PROGRESS NOTES
[facility]        NAME: Gamaliel Douglas is a 23 y o  female  : 2000    MRN: [id number]  DATE: 2020  TIME: 5:13 PM    Assessment and Plan   Sore throat [J02 9]  1  Sore throat  POCT rapid strepA    azithromycin (ZITHROMAX) 250 mg tablet     Rapid strep:  Negative  Throat culture pending  Treated on clinical presentation  Patient Instructions     Patient Instructions   1  Your rapid strep test is negativ today  2  A throat culture is pending in 48 hours  Please call for results  3  Chloroseptic spray for pain  4   May use Tylenol or Advil for pain  Pharyngitis   WHAT YOU NEED TO KNOW:   Pharyngitis, or sore throat, is inflammation of the tissues and structures in your pharynx (throat)  Pharyngitis is most often caused by bacteria  It may also be caused by a cold or flu virus  Other causes include smoking, allergies, or acid reflux  DISCHARGE INSTRUCTIONS:   Call 911 for any of the following:   · You have trouble breathing or swallowing because your throat is swollen or sore  Return to the emergency department if:   · You are drooling because it hurts too much to swallow  · Your fever is higher than 102? F (39?C) or lasts longer than 3 days  · You are confused  · You taste blood in your throat  Contact your healthcare provider if:   · Your throat pain gets worse  · You have a painful lump in your throat that does not go away after 5 days  · Your symptoms do not improve after 5 days  · You have questions or concerns about your condition or care  Medicines:  Viral pharyngitis will go away on its own without treatment  Your sore throat should start to feel better in 3 to 5 days for both viral and bacterial infections  You may need any of the following:  · Antibiotics  treat a bacterial infection  · NSAIDs , such as ibuprofen, help decrease swelling, pain, and fever  NSAIDs can cause stomach bleeding or kidney problems in certain people   If you take blood thinner medicine, always ask your healthcare provider if NSAIDs are safe for you  Always read the medicine label and follow directions  · Acetaminophen  decreases pain and fever  It is available without a doctor's order  Ask how much to take and how often to take it  Follow directions  Acetaminophen can cause liver damage if not taken correctly  · Take your medicine as directed  Contact your healthcare provider if you think your medicine is not helping or if you have side effects  Tell him or her if you are allergic to any medicine  Keep a list of the medicines, vitamins, and herbs you take  Include the amounts, and when and why you take them  Bring the list or the pill bottles to follow-up visits  Carry your medicine list with you in case of an emergency  Manage your symptoms:   · Gargle salt water  Mix ¼ teaspoon salt in an 8 ounce glass of warm water and gargle  This may help decrease swelling in your throat  · Drink liquids as directed  You may need to drink more liquids than usual  Liquids may help soothe your throat and prevent dehydration  Ask how much liquid to drink each day and which liquids are best for you  · Use a cool-steam humidifier  to help moisten the air in your room and calm your cough  · Soothe your throat  with cough drops, ice, soft foods, or popsicles  Prevent the spread of pharyngitis:  Cover your mouth and nose when you cough or sneeze  Do not share food or drinks  Wash your hands often  Use soap and water  If soap and water are unavailable, use an alcohol based hand   Follow up with your healthcare provider as directed:  Write down your questions so you remember to ask them during your visits  © 2017 [facility] Information is for End User's use only and may not be sold, redistributed or otherwise used for commercial purposes   All illustrations and images included in CareNotes® are the copyrighted property of [employer]  or [employer]  The above information is an  only  It is not intended as medical advice for individual conditions or treatments  Talk to your doctor, nurse or pharmacist before following any medical regimen to see if it is safe and effective for you  Follow up with PCP in 3-5 days  Proceed to  ER if symptoms worsen  Chief Complaint     Chief Complaint   Patient presents with    Sore Throat     pt states that she has a "clicking" feeling in the left side of her throat when she swallows, and it has become painful  History of Present Illness       This is a 19-year-old who presents with onset of 24 hours of sore throat, left side worse than right  and a clicking sensation the left side of her throat when she swallows  She has not had runny nose, cough, or fever with this  She does smoke 5-6 cigarettes per day  She has had no flu shot  There are no others ill at home  Review of Systems   Review of Systems   Constitutional: Negative for fever  HENT: Positive for sore throat  Negative for congestion, rhinorrhea, sinus pressure and sinus pain  Eyes: Negative for discharge and redness  Respiratory: Negative for cough, shortness of breath and wheezing  Cardiovascular: Negative for chest pain  Gastrointestinal: Negative for diarrhea, nausea and vomiting  Musculoskeletal: Negative for arthralgias and myalgias           Current Medications       Current Outpatient Medications:     azithromycin (ZITHROMAX) 250 mg tablet, Take 2 tablets today then 1 tablet daily x 4 days, Disp: 6 tablet, Rfl: 0    ondansetron (ZOFRAN) 4 mg tablet, Take 1 tablet (4 mg total) by mouth every 8 (eight) hours as needed for nausea or vomiting (Patient not taking: Reported on 1/16/2020), Disp: 20 tablet, Rfl: 0    Current Allergies     Allergies as of 01/16/2020 - Reviewed 01/16/2020   Allergen Reaction Noted    Penicillins  03/01/2019            The following portions of the patient's history were reviewed and updated as appropriate: allergies, current medications, past family history, past medical history, past social history, past surgical history and problem list      Past Medical History:   Diagnosis Date    Viral gastroenteritis     [id number] RESOLVED       Past Surgical History:   Procedure Laterality Date    EYE SURGERY      STRABISMUS SURGERY      TONSILLECTOMY         Family History   Problem Relation Age of Onset    No Known Problems Mother     Hypertension Maternal Grandmother     Hyperlipidemia Maternal Grandfather     Hypertension Maternal Grandfather     No Known Problems Family     No Known Problems Father          Medications have been verified  Objective   /74   Pulse 97   Temp 97 5 °F (36 4 °C) (Tympanic)   Resp 18   Ht 5' (1 524 m)   Wt 69 kg (152 lb 3 2 oz)   LMP 01/11/2020 (Exact Date)   SpO2 97%   BMI 29 72 kg/m²        Physical Exam     Physical Exam   Constitutional: She is oriented to person, place, and time  She appears well-developed and well-nourished  HENT:   Head: Normocephalic and atraumatic  Right Ear: Hearing, tympanic membrane, external ear and ear canal normal    Left Ear: Hearing, tympanic membrane, external ear and ear canal normal    Nose: Nose normal    Mouth/Throat: Mucous membranes are normal  No oral lesions  No uvula swelling  Posterior oropharyngeal erythema present  No oropharyngeal exudate or tonsillar abscesses  There is symmetric rise to the soft palate  Eyes: Pupils are equal, round, and reactive to light  Conjunctivae and EOM are normal    Neck: Normal range of motion  Neck supple  Cardiovascular: Normal rate, regular rhythm and normal heart sounds  No murmur heard  Pulmonary/Chest: Effort normal and breath sounds normal  She has no wheezes  She has no rhonchi  She has no rales  Abdominal: Soft  Bowel sounds are normal  She exhibits no distension and no mass  There is no tenderness     Musculoskeletal: Normal range of motion  Lymphadenopathy:     She has no cervical adenopathy  Neurological: She is alert and oriented to person, place, and time  Skin: Skin is warm and dry  No rash noted  No erythema  Psychiatric: She has a normal mood and affect  Her behavior is normal    Nursing note and vitals reviewed

## 2020-01-18 LAB — BACTERIA THROAT CULT: NORMAL

## 2020-03-04 ENCOUNTER — OFFICE VISIT (OUTPATIENT)
Dept: URGENT CARE | Facility: CLINIC | Age: 20
End: 2020-03-04
Payer: COMMERCIAL

## 2020-03-04 VITALS
BODY MASS INDEX: 30.08 KG/M2 | HEART RATE: 108 BPM | HEIGHT: 60 IN | OXYGEN SATURATION: 99 % | SYSTOLIC BLOOD PRESSURE: 130 MMHG | RESPIRATION RATE: 18 BRPM | WEIGHT: 153.2 LBS | DIASTOLIC BLOOD PRESSURE: 82 MMHG | TEMPERATURE: 98.9 F

## 2020-03-04 DIAGNOSIS — R19.7 DIARRHEA, UNSPECIFIED TYPE: Primary | ICD-10-CM

## 2020-03-04 PROCEDURE — 99213 OFFICE O/P EST LOW 20 MIN: CPT | Performed by: NURSE PRACTITIONER

## 2020-03-04 NOTE — LETTER
March 4, 2020     Patient: Jewels Corinne   YOB: 2000   Date of Visit: 3/4/2020       To Whom It May Concern: It is my medical opinion that Kerrie Arboleda may return to work on 03/05/2020  If you have any questions or concerns, please don't hesitate to call           Sincerely,        [facility] SUSAN LAMB    CC: Jewels Sun

## 2020-03-04 NOTE — PATIENT INSTRUCTIONS
As we discussed you state been having these issues on and off for a while  I would recommend following up with the GI doctor  Continue to watch the ED  Maybe keep a diary and avoid soft that contains lactose  [place] foods and extra fluids until your feeling 100% better  Nam Garcia Referral placed for GI  As we discussed if you have any worsening symptoms, increased abdominal pain or any new symptoms I would recommend going to the ER

## 2020-03-04 NOTE — PROGRESS NOTES
[facility]        NAME: Erik Garcia is a 23 y o  female  : 2000    MRN: [id number]  DATE: 2020  TIME: 3:51 PM    Assessment and Plan   Diarrhea, unspecified type [R19 7]  1  Diarrhea, unspecified type  Ambulatory referral to Gastroenterology         Patient Instructions     Patient Instructions   As we discussed you state been having these issues on and off for a while  I would recommend following up with the GI doctor  Continue to watch the ED  Maybe keep a diary and avoid soft that contains lactose  [place] foods and extra fluids until your feeling 100% better  Gladys Krause Referral placed for GI  As we discussed if you have any worsening symptoms, increased abdominal pain or any new symptoms I would recommend going to the ER  Chief Complaint     Chief Complaint   Patient presents with    Diarrhea     Patient c/o abdominal pain and diarrhea x 2 days         History of Present Illness   Jewels Reynoso presents to the clinic c/o    This is a 76-year-old female here today with complaints of diarrhea since   She states she also has some abdominal pain  She states that this time symptoms are improving  She states stool is loose and dark brown in color  She denies any blood in her stool  She denies any change in her diet  She does note that she is lactose intolerant  She denies any fevers at this time  She does not recall any change in her diet  She does note that symptoms do get worse after she drinks tear  She denies any chance of pregnancy  She is not currently sexually active  Overall she states her symptoms are improving but she needs a note to go back to work  She feels as though the symptoms have been her current for while  Review of Systems   Review of Systems   Constitutional: Negative for activity change and fatigue  Respiratory: Negative  Gastrointestinal: Negative  Genitourinary: Negative      Musculoskeletal: Positive for arthralgias and back pain    Skin: Negative  Neurological: Negative  Psychiatric/Behavioral: Negative  Current Medications     Long-Term Medications   Medication Sig Dispense Refill    ondansetron (ZOFRAN) 4 mg tablet Take 1 tablet (4 mg total) by mouth every 8 (eight) hours as needed for nausea or vomiting (Patient not taking: Reported on 1/16/2020) 20 tablet 0       Current Allergies     Allergies as of 03/04/2020 - Reviewed 03/04/2020   Allergen Reaction Noted    Penicillins  03/01/2019            The following portions of the patient's history were reviewed and updated as appropriate: allergies, current medications, past family history, past medical history, past social history, past surgical history and problem list     Objective   /82   Pulse (!) 108   Temp 98 9 °F (37 2 °C) (Temporal)   Resp 18   Ht 5' (1 524 m)   Wt 69 5 kg (153 lb 3 2 oz)   LMP 02/12/2020 (Exact Date)   SpO2 99%   BMI 29 92 kg/m²        Physical Exam     Physical Exam   Constitutional: She is oriented to person, place, and time  She appears well-developed and well-nourished  Neck: Normal range of motion  Cardiovascular: Normal rate, regular rhythm and normal heart sounds  Pulmonary/Chest: Effort normal and breath sounds normal    Abdominal: She exhibits no distension and no mass  There is no tenderness  There is no rebound and no guarding  Musculoskeletal: Normal range of motion  Neurological: She is alert and oriented to person, place, and time  Psychiatric: She has a normal mood and affect  Her behavior is normal  Judgment and thought content normal    Nursing note and vitals reviewed

## 2020-04-21 ENCOUNTER — TELEPHONE (OUTPATIENT)
Dept: FAMILY MEDICINE CLINIC | Facility: CLINIC | Age: 20
End: 2020-04-21

## 2021-06-22 ENCOUNTER — TELEPHONE (OUTPATIENT)
Dept: FAMILY MEDICINE CLINIC | Facility: CLINIC | Age: 21
End: 2021-06-22

## 2021-06-22 NOTE — TELEPHONE ENCOUNTER
Please try again contacting pt to schedule appt- has only been seen by us for sick visits and last was with Miracle Gomez 01/2020; also, pt did not respond letter sent last year regarding need for appt (her phone number was out of service per 04/2020 phone note)